# Patient Record
Sex: FEMALE | Race: WHITE | NOT HISPANIC OR LATINO | Employment: PART TIME | URBAN - METROPOLITAN AREA
[De-identification: names, ages, dates, MRNs, and addresses within clinical notes are randomized per-mention and may not be internally consistent; named-entity substitution may affect disease eponyms.]

---

## 2023-04-09 DIAGNOSIS — G47.00 INSOMNIA, UNSPECIFIED: ICD-10-CM

## 2023-04-10 RX ORDER — TRAZODONE HYDROCHLORIDE 50 MG/1
TABLET ORAL
Qty: 90 TABLET | Refills: 1 | Status: SHIPPED | OUTPATIENT
Start: 2023-04-10 | End: 2023-10-12

## 2023-10-12 DIAGNOSIS — G47.00 INSOMNIA, UNSPECIFIED: ICD-10-CM

## 2023-10-12 RX ORDER — TRAZODONE HYDROCHLORIDE 50 MG/1
TABLET ORAL
Qty: 90 TABLET | Refills: 1 | Status: SHIPPED | OUTPATIENT
Start: 2023-10-12

## 2024-06-13 ENCOUNTER — EVALUATION (OUTPATIENT)
Dept: PHYSICAL THERAPY | Facility: CLINIC | Age: 69
End: 2024-06-13
Payer: MEDICARE

## 2024-06-13 DIAGNOSIS — G89.29 OTHER CHRONIC PAIN: ICD-10-CM

## 2024-06-13 DIAGNOSIS — M25.512 CHRONIC LEFT SHOULDER PAIN: ICD-10-CM

## 2024-06-13 DIAGNOSIS — G89.29 CHRONIC LEFT SHOULDER PAIN: ICD-10-CM

## 2024-06-13 DIAGNOSIS — M25.512 PAIN IN LEFT SHOULDER: Primary | ICD-10-CM

## 2024-06-13 PROCEDURE — 97161 PT EVAL LOW COMPLEX 20 MIN: CPT | Mod: GP

## 2024-06-13 PROCEDURE — 97110 THERAPEUTIC EXERCISES: CPT | Mod: GP

## 2024-06-13 ASSESSMENT — ENCOUNTER SYMPTOMS
OCCASIONAL FEELINGS OF UNSTEADINESS: 0
LOSS OF SENSATION IN FEET: 1
DEPRESSION: 0

## 2024-06-13 ASSESSMENT — PATIENT HEALTH QUESTIONNAIRE - PHQ9
2. FEELING DOWN, DEPRESSED OR HOPELESS: NOT AT ALL
SUM OF ALL RESPONSES TO PHQ9 QUESTIONS 1 AND 2: 0
1. LITTLE INTEREST OR PLEASURE IN DOING THINGS: NOT AT ALL

## 2024-06-13 ASSESSMENT — PAIN - FUNCTIONAL ASSESSMENT: PAIN_FUNCTIONAL_ASSESSMENT: 0-10

## 2024-06-13 ASSESSMENT — PAIN SCALES - GENERAL: PAINLEVEL_OUTOF10: 2

## 2024-06-13 NOTE — LETTER
June 13, 2024    Nena Larry MD  [address] [facility] [address]    Patient: Karlee Valentin   YOB: 1955   Date of Visit: 6/13/2024       Dear No referring provider defined for this encounter.    The attached plan of care is being sent to you because your patient’s medical reimbursement requires that you certify the plan of care. Your signature is required to allow uninterrupted insurance coverage.      You may indicate your approval by signing below and faxing this form back to us at Dept Fax: [phone].    Please call Dept: [phone] with any questions or concerns.    Thank you for this referral,        Jose C Porter  [facility] [address]  [address] [address]    Payer: Payor: [employer] MEDICARE / Plan: [employer] GOLD CHOICE / Product Type: *No Product type* /                                                                         Date:     Dear Jose C Porter,     Re: Ms. Karlee Valentin, MRN:[id number]    I certify that I have reviewed the attached plan of care and it is medically necessary for Ms. Karlee Valentin (1955) who is under my care.          ______________________________________                    _________________  Provider name and credentials                                           Date and time                                                                                           Plan of Care 6/13/24   Effective from: 6/13/2024  Effective to: 9/13/2024    Plan ID: [id number]            Participants as of Finalize on 6/13/2024    Name Type Comments Contact Info    Nena aLrry MD Referring Provider  [phone]       Last Plan Note     Author: Jose C Porter Status: Sign when Signing Visit Last edited: 6/13/2024  4:15 PM       Physical Therapy    Physical Therapy Evaluation and Treatment      Patient Name: Karlee Valentin  MRN: [id number]  Today's Date: 6/13/2024  Time Calculation  Start Time: 1615  Stop Time:  "8667  Time Calculation (min): 40 min    Assessment:  PT Assessment  PT Assessment Results: Decreased strength, Decreased range of motion, Decreased mobility, Pain  Rehab Prognosis: Good   The patient is a 68 year old female presenting to PT with L shoulder pain, L shoulder weakness and limited mobility.  The patient will benefit from skilled intervention to address above deficits to ease ability to perform ADLs.    Plan:  OP PT Plan  Treatment/Interventions: Cryotherapy, Education/ Instruction, Hot pack, Therapeutic exercises  PT Plan: Skilled PT  PT Frequency:  (every other week)  Number of Treatments Authorized: $40 co-pay  Rehab Potential: Good  Plan of Care Agreement: Patient    Current Problem:   1. Pain in left shoulder  Referral to Physical Therapy    Follow Up In Physical Therapy      2. Other chronic pain  Referral to Physical Therapy      3. Chronic left shoulder pain          Subjective    General:  General  Reason for Referral: L shoulder pain  Referred By: Laron GOMES  Past Medical History Relevant to Rehab: reviewed  The patient reports she has been experiencing L shoulder pain, weakness and limited mobility after receiving her second COVID shot in 2021.  Pain ranges from 2-7/10.  She has difficulty reaching overhead & behind.  X-rays 9/23 from [facility] revealed \"bone on bone\" per pt.  The patient's goal is to decrease pain and increase L shoulder ROM/strength.      Precautions:  Precautions  STEADI Fall Risk Score (The score of 4 or more indicates an increased risk of falling): 1  Precautions Comment: none     Pain:  Pain Assessment  Pain Assessment: 0-10  Pain Score: 2  Pain Location: Shoulder  Pain Orientation: Left     Prior Level of Function:  Prior Function Per Pt/Caregiver Report  Level of [place]: Independent with ADLs and functional transfers  Hand Dominance: Right    Objective   L shoulder AROM (degrees)  Flexion = 123  Abduction = 70  IR = WFL  ER = 31    L shoulder " "strength  Flexion = 3+/5  Abduction = 3+/5  IR = 4-/5  ER = 4-/5    Outcome Measures:  Other Measures  Disability of Arm Shoulder Hand (DASH): 28     Treatments:  Supine Flexion with dowel - x10 10\"H  Seated ER with dowel - x10 10\"H  Seated Abd with dowel - x10 10\"H  ISO IR/ER - x10 ea 5\"H     EDUCATION:   HEP    Goals: (By week 8)   1) Increase L shoulder AROM by 15 degrees in all planes to ease capability to perform ADLs    2) Increase L shoulder strength to >4/5 for lifting household objects                   Current Participants as of 6/13/2024    Name Type Comments Contact Info    Nena Larry MD Referring Provider  [phone]    Signature pending      "

## 2024-06-13 NOTE — PROGRESS NOTES
"Physical Therapy    Physical Therapy Evaluation and Treatment      Patient Name: Karlee Valentin  MRN: [id number]  Today's Date: 6/13/2024  Time Calculation  Start Time: 1615  Stop Time: 1655  Time Calculation (min): 40 min    Assessment:  PT Assessment  PT Assessment Results: Decreased strength, Decreased range of motion, Decreased mobility, Pain  Rehab Prognosis: Good   The patient is a 68 year old female presenting to PT with L shoulder pain, L shoulder weakness and limited mobility.  The patient will benefit from skilled intervention to address above deficits to ease ability to perform ADLs.    Plan:  OP PT Plan  Treatment/Interventions: Cryotherapy, Education/ Instruction, Hot pack, Therapeutic exercises  PT Plan: Skilled PT  PT Frequency:  (every other week)  Number of Treatments Authorized: $40 co-pay  Rehab Potential: Good  Plan of Care Agreement: Patient    Current Problem:   1. Pain in left shoulder  Referral to Physical Therapy    Follow Up In Physical Therapy      2. Other chronic pain  Referral to Physical Therapy      3. Chronic left shoulder pain          Subjective    General:  General  Reason for Referral: L shoulder pain  Referred By: Laron GOMES  Past Medical History Relevant to Rehab: reviewed  The patient reports she has been experiencing L shoulder pain, weakness and limited mobility after receiving her second COVID shot in 2021.  Pain ranges from 2-7/10.  She has difficulty reaching overhead & behind.  X-rays 9/23 from [facility] revealed \"bone on bone\" per pt.  The patient's goal is to decrease pain and increase L shoulder ROM/strength.      Precautions:  Precautions  STEADI Fall Risk Score (The score of 4 or more indicates an increased risk of falling): 1  Precautions Comment: none     Pain:  Pain Assessment  Pain Assessment: 0-10  Pain Score: 2  Pain Location: Shoulder  Pain Orientation: Left     Prior Level of Function:  Prior Function Per Pt/Caregiver Report  Level of [place]: " "Independent with ADLs and functional transfers  Hand Dominance: Right    Objective   L shoulder AROM (degrees)  Flexion = 123  Abduction = 70  IR = WFL  ER = 31    L shoulder strength  Flexion = 3+/5  Abduction = 3+/5  IR = 4-/5  ER = 4-/5    Outcome Measures:  Other Measures  Disability of Arm Shoulder Hand (DASH): 28     Treatments:  Supine Flexion with dowel - x10 10\"H  Seated ER with dowel - x10 10\"H  Seated Abd with dowel - x10 10\"H  ISO IR/ER - x10 ea 5\"H     EDUCATION:   HEP    Goals: (By week 8)   1) Increase L shoulder AROM by 15 degrees in all planes to ease capability to perform ADLs    2) Increase L shoulder strength to >4/5 for lifting household objects            "

## 2024-07-08 ENCOUNTER — DOCUMENTATION (OUTPATIENT)
Dept: PHYSICAL THERAPY | Facility: CLINIC | Age: 69
End: 2024-07-08
Payer: MEDICARE

## 2024-07-08 ENCOUNTER — APPOINTMENT (OUTPATIENT)
Dept: PHYSICAL THERAPY | Facility: CLINIC | Age: 69
End: 2024-07-08
Payer: MEDICARE

## 2024-07-08 NOTE — PROGRESS NOTES
Physical Therapy                 Therapy Communication Note    Patient Name: Karlee Valentin  MRN: [id number]  Today's Date: 7/8/2024     Discipline: Physical Therapy    Missed Visit Reason:  ill    Missed Time: Cancel